# Patient Record
Sex: FEMALE | Race: ASIAN | NOT HISPANIC OR LATINO
[De-identification: names, ages, dates, MRNs, and addresses within clinical notes are randomized per-mention and may not be internally consistent; named-entity substitution may affect disease eponyms.]

---

## 2017-02-27 ENCOUNTER — RESULT REVIEW (OUTPATIENT)
Age: 42
End: 2017-02-27

## 2017-02-28 ENCOUNTER — APPOINTMENT (OUTPATIENT)
Dept: SURGICAL ONCOLOGY | Facility: CLINIC | Age: 42
End: 2017-02-28

## 2017-03-01 PROBLEM — Z00.00 ENCOUNTER FOR PREVENTIVE HEALTH EXAMINATION: Status: ACTIVE | Noted: 2017-03-01

## 2017-03-08 ENCOUNTER — RESULT REVIEW (OUTPATIENT)
Age: 42
End: 2017-03-08

## 2017-03-21 ENCOUNTER — APPOINTMENT (OUTPATIENT)
Dept: SURGICAL ONCOLOGY | Facility: CLINIC | Age: 42
End: 2017-03-21

## 2017-03-21 DIAGNOSIS — C73 MALIGNANT NEOPLASM OF THYROID GLAND: ICD-10-CM

## 2017-03-21 DIAGNOSIS — R59.0 LOCALIZED ENLARGED LYMPH NODES: ICD-10-CM

## 2019-01-07 ENCOUNTER — APPOINTMENT (OUTPATIENT)
Dept: CARDIOLOGY | Facility: CLINIC | Age: 44
End: 2019-01-07
Payer: MEDICAID

## 2019-01-07 ENCOUNTER — NON-APPOINTMENT (OUTPATIENT)
Age: 44
End: 2019-01-07

## 2019-01-07 VITALS
HEART RATE: 98 BPM | OXYGEN SATURATION: 96 % | SYSTOLIC BLOOD PRESSURE: 125 MMHG | TEMPERATURE: 98.1 F | HEIGHT: 64 IN | BODY MASS INDEX: 21.34 KG/M2 | WEIGHT: 125 LBS | RESPIRATION RATE: 16 BRPM | DIASTOLIC BLOOD PRESSURE: 86 MMHG

## 2019-01-07 DIAGNOSIS — R06.02 SHORTNESS OF BREATH: ICD-10-CM

## 2019-01-07 PROCEDURE — 93306 TTE W/DOPPLER COMPLETE: CPT

## 2019-01-07 PROCEDURE — 93015 CV STRESS TEST SUPVJ I&R: CPT

## 2019-01-07 PROCEDURE — 99204 OFFICE O/P NEW MOD 45 MIN: CPT | Mod: 25

## 2019-01-07 PROCEDURE — 93000 ELECTROCARDIOGRAM COMPLETE: CPT | Mod: 59

## 2019-01-07 RX ORDER — HYDROXYCHLOROQUINE SULFATE 200 MG/1
200 TABLET, FILM COATED ORAL
Qty: 60 | Refills: 0 | Status: ACTIVE | COMMUNITY
Start: 2018-10-24

## 2019-01-07 RX ORDER — ATORVASTATIN CALCIUM 80 MG/1
80 TABLET, FILM COATED ORAL
Qty: 30 | Refills: 0 | Status: ACTIVE | COMMUNITY
Start: 2018-12-03

## 2019-01-07 RX ORDER — CLOPIDOGREL BISULFATE 75 MG/1
75 TABLET, FILM COATED ORAL
Qty: 30 | Refills: 0 | Status: ACTIVE | COMMUNITY
Start: 2018-12-03

## 2019-01-07 NOTE — HISTORY OF PRESENT ILLNESS
[FreeTextEntry1] : 43 year-old female with hx of Thyroid and Breast cancer had a stroke in November affecting the right side, which she has since made significant improvement. Patient had symptoms of trouble understanding speech and also had a fall. She went to the ER and MRI of brain. Patient was diagnosed with MS recently and was told by her ophthalmologist that she has retinal vascular disease. Stress echo.

## 2019-01-07 NOTE — PHYSICAL EXAM
[General Appearance - Well Developed] : well developed [Normal Appearance] : normal appearance [Well Groomed] : well groomed [General Appearance - Well Nourished] : well nourished [No Deformities] : no deformities [General Appearance - In No Acute Distress] : no acute distress [Normal Conjunctiva] : the conjunctiva exhibited no abnormalities [Eyelids - No Xanthelasma] : the eyelids demonstrated no xanthelasmas [Normal Oral Mucosa] : normal oral mucosa [No Oral Pallor] : no oral pallor [No Oral Cyanosis] : no oral cyanosis [Normal Jugular Venous A Waves Present] : normal jugular venous A waves present [Normal Jugular Venous V Waves Present] : normal jugular venous V waves present [No Jugular Venous Olea A Waves] : no jugular venous olea A waves [Heart Rate And Rhythm] : heart rate and rhythm were normal [Heart Sounds] : normal S1 and S2 [Murmurs] : no murmurs present [Respiration, Rhythm And Depth] : normal respiratory rhythm and effort [Exaggerated Use Of Accessory Muscles For Inspiration] : no accessory muscle use [Auscultation Breath Sounds / Voice Sounds] : lungs were clear to auscultation bilaterally [Abdomen Soft] : soft [Abdomen Tenderness] : non-tender [Abdomen Mass (___ Cm)] : no abdominal mass palpated [Abnormal Walk] : normal gait [Gait - Sufficient For Exercise Testing] : the gait was sufficient for exercise testing [Nail Clubbing] : no clubbing of the fingernails [Cyanosis, Localized] : no localized cyanosis [Petechial Hemorrhages (___cm)] : no petechial hemorrhages [] : no ischemic changes [Oriented To Time, Place, And Person] : oriented to person, place, and time [Affect] : the affect was normal [Mood] : the mood was normal [No Anxiety] : not feeling anxious

## 2019-01-17 PROBLEM — R06.02 SHORTNESS OF BREATH: Status: ACTIVE | Noted: 2019-01-07
